# Patient Record
Sex: MALE | Race: WHITE | NOT HISPANIC OR LATINO | Employment: OTHER | URBAN - METROPOLITAN AREA
[De-identification: names, ages, dates, MRNs, and addresses within clinical notes are randomized per-mention and may not be internally consistent; named-entity substitution may affect disease eponyms.]

---

## 2019-10-18 ENCOUNTER — CLINICAL SUPPORT (OUTPATIENT)
Dept: UROLOGY | Facility: CLINIC | Age: 54
End: 2019-10-18
Payer: COMMERCIAL

## 2019-10-18 DIAGNOSIS — N52.9 ERECTILE DYSFUNCTION, UNSPECIFIED ERECTILE DYSFUNCTION TYPE: Primary | ICD-10-CM

## 2019-10-18 LAB
AMORPHOUS URINE: ABNORMAL /LPF
APPEARANCE, UA: CLEAR
BACTERIA SPEC CULT: ABNORMAL /HPF
BASOPHILS NFR SNV MANUAL: 0.5 % (ref 0–3)
BILIRUB UR QL STRIP: NEGATIVE MG/DL
BUN SERPL-MCNC: 12 MG/DL (ref 7–18)
BUN/CREAT SERPL: 6.55 RATIO (ref 7–18)
CALCIUM SERPL-MCNC: 9.6 MG/DL (ref 8.8–10.5)
CHLORIDE SERPL-SCNC: 103 MMOL/L (ref 100–108)
CO2 SERPL-SCNC: 30 MMOL/L (ref 21–32)
COLOR UR: ABNORMAL
CREAT SERPL-MCNC: 1.83 MG/DL (ref 0.7–1.3)
EOSINOPHIL NFR SNV MANUAL: 3.4 % (ref 1–3)
ERYTHROCYTE [DISTWIDTH] IN BLOOD BY AUTOMATED COUNT: 13.4 % (ref 12.5–18)
GFR ESTIMATION: 39
GLUCOSE (UA): NORMAL MG/DL
GLUCOSE SERPL-MCNC: 91 MG/DL (ref 70–110)
HCT VFR BLD AUTO: 50.6 % (ref 42–52)
HGB BLD-MCNC: 17.5 G/DL (ref 14–18)
HGB UR QL STRIP: NEGATIVE /UL
KETONES UR QL STRIP: NEGATIVE MG/DL
LEUKOCYTE ESTERASE UR QL STRIP: 25 /UL
LYMPHOCYTES NFR SNV MANUAL: 20.9 % (ref 25–40)
MANUAL NRBC PER 100 CELLS: 0 %
MCH RBC QN AUTO: 31.6 PG (ref 27–31.2)
MCHC RBC AUTO-ENTMCNC: 34.6 G/DL (ref 31.8–35.4)
MCV RBC AUTO: 91.5 FL (ref 80–97)
MONOCYTES/100 LEUKOCYTES: 6.9 % (ref 1–15)
MUCUS URINE: ABNORMAL /LPF
NEUTROPHILS NFR BLD: 10.05 10*3/UL (ref 1.8–7.7)
NEUTROPHILS NFR SNV MANUAL: 67.8 % (ref 37–80)
NITRITE UR QL STRIP: NEGATIVE
PH UR STRIP: 7 PH (ref 5–9)
PLATELETS: 383 10*3/UL (ref 142–424)
POTASSIUM SERPL-SCNC: 4.1 MMOL/L (ref 3.6–5.2)
PROT UR QL STRIP: NEGATIVE MG/DL
RBC # BLD AUTO: 5.53 10*6/UL (ref 4.7–6.1)
RBC #/AREA URNS HPF: ABNORMAL /HPF (ref 0–2)
SERVICE COMMENT 03: ABNORMAL
SODIUM BLD-SCNC: 141 MMOL/L (ref 135–145)
SP GR UR STRIP: 1.01 (ref 1–1.03)
SPECIMEN COLLECTION METHOD, URINE: ABNORMAL
SQUAMOUS EPITHELIAL, UA: ABNORMAL /LPF
UROBILINOGEN UR STRIP-ACNC: NORMAL MG/DL
WBC # BLD: 14.9 10*3/UL (ref 4.6–10.2)
WBC #/AREA URNS HPF: ABNORMAL /HPF (ref 0–5)

## 2019-10-24 ENCOUNTER — OUTSIDE PLACE OF SERVICE (OUTPATIENT)
Dept: UROLOGY | Facility: CLINIC | Age: 54
End: 2019-10-24
Payer: COMMERCIAL

## 2019-10-24 PROCEDURE — 54405 INSERT MULTI-COMP PENIS PROS: CPT | Mod: ,,, | Performed by: UROLOGY

## 2019-10-24 PROCEDURE — 54405 PR INSERT,INFLATABLE PENILE PROSTHESIS: ICD-10-PCS | Mod: ,,, | Performed by: UROLOGY

## 2019-11-19 ENCOUNTER — OFFICE VISIT (OUTPATIENT)
Dept: UROLOGY | Facility: CLINIC | Age: 54
End: 2019-11-19
Payer: COMMERCIAL

## 2019-11-19 VITALS
DIASTOLIC BLOOD PRESSURE: 82 MMHG | BODY MASS INDEX: 38.51 KG/M2 | HEIGHT: 69 IN | HEART RATE: 91 BPM | WEIGHT: 260 LBS | RESPIRATION RATE: 18 BRPM | SYSTOLIC BLOOD PRESSURE: 138 MMHG

## 2019-11-19 DIAGNOSIS — N52.9 ERECTILE DYSFUNCTION, UNSPECIFIED ERECTILE DYSFUNCTION TYPE: Primary | ICD-10-CM

## 2019-11-19 PROCEDURE — 99024 POSTOP FOLLOW-UP VISIT: CPT | Mod: S$GLB,,, | Performed by: UROLOGY

## 2019-11-19 PROCEDURE — 99024 PR POST-OP FOLLOW-UP VISIT: ICD-10-PCS | Mod: S$GLB,,, | Performed by: UROLOGY

## 2019-11-19 RX ORDER — TOPIRAMATE 100 MG/1
TABLET, FILM COATED ORAL
COMMUNITY
Start: 2019-10-26

## 2019-11-19 RX ORDER — FLUTICASONE PROPIONATE 50 MCG
SPRAY, SUSPENSION (ML) NASAL
COMMUNITY
Start: 2019-11-18

## 2019-11-19 RX ORDER — TIOTROPIUM BROMIDE AND OLODATEROL 3.124; 2.736 UG/1; UG/1
SPRAY, METERED RESPIRATORY (INHALATION)
COMMUNITY
Start: 2019-11-18

## 2019-11-19 RX ORDER — PANTOPRAZOLE SODIUM 40 MG/1
TABLET, DELAYED RELEASE ORAL
COMMUNITY
Start: 2019-10-12

## 2019-11-19 RX ORDER — TESTOSTERONE CYPIONATE 200 MG/ML
INJECTION, SOLUTION INTRAMUSCULAR
COMMUNITY
Start: 2019-11-18

## 2019-11-19 RX ORDER — ALBUTEROL SULFATE 90 UG/1
AEROSOL, METERED RESPIRATORY (INHALATION)
COMMUNITY
Start: 2019-10-26

## 2019-11-19 RX ORDER — METHOCARBAMOL 500 MG/1
TABLET, FILM COATED ORAL
COMMUNITY
Start: 2019-11-06

## 2019-11-19 RX ORDER — CEPHALEXIN 500 MG/1
CAPSULE ORAL
Refills: 0 | COMMUNITY
Start: 2019-10-25

## 2019-11-19 RX ORDER — CIPROFLOXACIN 500 MG/1
TABLET ORAL
Refills: 0 | COMMUNITY
Start: 2019-10-07

## 2019-11-19 RX ORDER — MONTELUKAST SODIUM 10 MG/1
TABLET ORAL
COMMUNITY
Start: 2019-10-18

## 2019-11-19 RX ORDER — SIMVASTATIN 20 MG/1
TABLET, FILM COATED ORAL
COMMUNITY
Start: 2019-09-17

## 2019-11-19 RX ORDER — CARBAMAZEPINE 200 MG/1
TABLET ORAL
COMMUNITY
Start: 2019-11-18

## 2019-11-19 RX ORDER — PROMETHAZINE HYDROCHLORIDE 25 MG/1
TABLET ORAL
Refills: 0 | COMMUNITY
Start: 2019-10-07

## 2019-11-19 RX ORDER — OXYCODONE AND ACETAMINOPHEN 7.5; 325 MG/1; MG/1
TABLET ORAL
Refills: 0 | COMMUNITY
Start: 2019-10-25

## 2019-11-19 RX ORDER — CALCITRIOL 3 UG/G
OINTMENT TOPICAL
COMMUNITY
Start: 2019-10-26

## 2019-11-19 RX ORDER — POTASSIUM CHLORIDE 1500 MG/1
TABLET, EXTENDED RELEASE ORAL
COMMUNITY
Start: 2019-10-18

## 2019-11-19 RX ORDER — HYDROCODONE BITARTRATE AND ACETAMINOPHEN 10; 325 MG/1; MG/1
TABLET ORAL
COMMUNITY
Start: 2019-10-01

## 2019-11-19 RX ORDER — CLOBETASOL PROPIONATE 0.5 MG/G
OINTMENT TOPICAL
COMMUNITY
Start: 2019-10-26

## 2019-11-19 RX ORDER — CHLORHEXIDINE GLUCONATE 1.2 MG/ML
RINSE BUCCAL
COMMUNITY
Start: 2019-10-26

## 2019-11-19 NOTE — LETTER
November 19, 2019      [facility]  [address] [address]  Phone: [phone]  Fax: [phone]       Patient: Óscra Marina   YOB: 1965  Date of Visit: 11/19/2019    To Whom It May Concern:    TEDDY Marina  was at [facility] on 11/19/2019. He may return to work/school on 11/19/2019 with no restrictions. If you have any questions or concerns, or if I can be of further assistance, please do not hesitate to contact me.    Sincerely,    Anum Rodriges LPN

## 2019-11-19 NOTE — PROGRESS NOTES
Subjective:       Patient ID: Óscar Marina is a 54 y.o. male.    Chief Complaint: Other (3 week prosthesis f/u )      HPI: Post op prosthesis 3 weeks      Past Medical History: History reviewed. No pertinent past medical history.    Past Surgical Historical: History reviewed. No pertinent surgical history.     Medications:   Medication List with Changes/Refills   Current Medications    CARBAMAZEPINE (TEGRETOL) 200 MG TABLET        CEPHALEXIN (KEFLEX) 500 MG CAPSULE    TK ONE C PO QID    CIPROFLOXACIN HCL (CIPRO) 500 MG TABLET    TK 1 T PO Q 12 H FOR 5 DAYS    CLOBETASOL 0.05% (TEMOVATE) 0.05 % OINT        DEPO-TESTOSTERONE 200 MG/ML INJECTION        FLUTICASONE PROPIONATE (FLONASE) 50 MCG/ACTUATION NASAL SPRAY        HYDROCODONE-ACETAMINOPHEN (NORCO)  MG PER TABLET        KLOR-CON M20 20 MEQ TABLET        METHOCARBAMOL (ROBAXIN) 500 MG TAB        MONTELUKAST (SINGULAIR) 10 MG TABLET        OXYCODONE-ACETAMINOPHEN (PERCOCET) 7.5-325 MG PER TABLET    TK 1 T PO Q 6 H PRF PAIN    PANTOPRAZOLE (PROTONIX) 40 MG TABLET        PERIOGARD 0.12 % SOLUTION        PROAIR HFA 90 MCG/ACTUATION INHALER        PROMETHAZINE (PHENERGAN) 25 MG TABLET    TK 1 T PO Q 6 H PRF NAUSEA    SIMVASTATIN (ZOCOR) 20 MG TABLET        STIOLTO RESPIMAT 2.5-2.5 MCG/ACTUATION MIST        TOPIRAMATE (TOPAMAX) 100 MG TABLET        VECTICAL 3 MCG/GRAM OINTMENT            Past Social History:   Social History     Socioeconomic History    Marital status:      Spouse name: Not on file    Number of children: Not on file    Years of education: Not on file    Highest education level: Not on file   Occupational History    Not on file   Social Needs    Financial resource strain: Not on file    Food insecurity:     Worry: Not on file     Inability: Not on file    Transportation needs:     Medical: Not on file     Non-medical: Not on file   Tobacco Use    Smoking status: Current Every Day Smoker     Packs/day: 0.25     Types: Cigarettes    Substance and Sexual Activity    Alcohol use: Not Currently     Frequency: Never    Drug use: Never    Sexual activity: Not on file   Lifestyle    Physical activity:     Days per week: Not on file     Minutes per session: Not on file    Stress: Not on file   Relationships    Social connections:     Talks on phone: Not on file     Gets together: Not on file     Attends Restorationist service: Not on file     Active member of club or organization: Not on file     Attends meetings of clubs or organizations: Not on file     Relationship status: Not on file   Other Topics Concern    Not on file   Social History Narrative    Not on file       Allergies:   Review of patient's allergies indicates:   Allergen Reactions    Morphine     Sulfa (sulfonamide antibiotics)         Family History: History reviewed. No pertinent family history.     Review of Systems:  Review of Systems   Constitutional: Negative for activity change and appetite change.   HENT: Negative for congestion and dental problem.    Respiratory: Negative for chest tightness and shortness of breath.    Cardiovascular: Negative for chest pain.   Gastrointestinal: Negative for abdominal distention and abdominal pain.   Genitourinary: Negative for decreased urine volume, difficulty urinating, discharge, dysuria, enuresis, flank pain, frequency, genital sores, hematuria, penile pain, penile swelling, scrotal swelling, testicular pain and urgency.   Musculoskeletal: Negative for back pain and neck pain.   Neurological: Negative for dizziness.   Hematological: Negative for adenopathy.   Psychiatric/Behavioral: Negative for agitation, behavioral problems and confusion.       Physical Exam:  Physical Exam   Nursing note and vitals reviewed.  Constitutional: He is oriented to person, place, and time. He appears well-developed and well-nourished.   HENT:   Head: Normocephalic.   Cardiovascular: Normal rate, regular rhythm and normal heart sounds.     Pulmonary/Chest: Effort normal and breath sounds normal.   Abdominal: Soft. Bowel sounds are normal.   Genitourinary:         Neurological: He is alert and oriented to person, place, and time.   Skin: Skin is warm and dry.         Assessment/Plan:     post op prosthesis healing well  Fu three weeks  Problem List Items Addressed This Visit     None

## 2019-12-06 ENCOUNTER — OFFICE VISIT (OUTPATIENT)
Dept: UROLOGY | Facility: CLINIC | Age: 54
End: 2019-12-06
Payer: COMMERCIAL

## 2019-12-06 VITALS
RESPIRATION RATE: 20 BRPM | HEART RATE: 84 BPM | BODY MASS INDEX: 38.36 KG/M2 | HEIGHT: 69 IN | SYSTOLIC BLOOD PRESSURE: 140 MMHG | WEIGHT: 259 LBS | DIASTOLIC BLOOD PRESSURE: 82 MMHG

## 2019-12-06 DIAGNOSIS — N52.9 ERECTILE DYSFUNCTION, UNSPECIFIED ERECTILE DYSFUNCTION TYPE: Primary | ICD-10-CM

## 2019-12-06 DIAGNOSIS — E29.1 HYPOGONADISM MALE: ICD-10-CM

## 2019-12-06 LAB — TESTOST SERPL-MCNC: 1064 NG/DL (ref 95–948)

## 2019-12-06 PROCEDURE — 99024 PR POST-OP FOLLOW-UP VISIT: ICD-10-PCS | Mod: S$GLB,,, | Performed by: NURSE PRACTITIONER

## 2019-12-06 PROCEDURE — 99024 POSTOP FOLLOW-UP VISIT: CPT | Mod: S$GLB,,, | Performed by: NURSE PRACTITIONER

## 2019-12-06 NOTE — LETTER
December 6, 2019      [facility]  [address] [address]  Phone: [phone]  Fax: [phone]       Patient: Óscar Marina   YOB: 1965  Date of Visit: 12/06/2019    To Whom It May Concern:    TEDDY Marina  was at [facility] on 12/06/2019. He may return to work/school on 12/7/19 with no restrictions. If you have any questions or concerns, or if I can be of further assistance, please do not hesitate to contact me.    Sincerely,    Sloane Forde MA

## 2019-12-06 NOTE — PROGRESS NOTES
Subjective:       Patient ID: Óscar Marina is a 54 y.o. male.    Chief Complaint: Other (6 week post op F/U for penile prosthesis)      HPI: 54-year-old male, presents 6 week postop penile prosthesis.  Patient states he is doing well.  Denies any fever.  Denies any pain at the surgery site.  Patient also has a history of hypogonadism.  At 1 point he was taking 400 mg testosterone injections every 2 weeks.  About 8 to 10 weeks ago we decreased his testosterone to 200 mg every 2 weeks.  Patient states his energy level is okay.  Patient denies any pain or burning with urination.  States has a good stream.  Denies seeing blood in his urine.  No other urinary complaints.  Other health problems are stable at this time.      Past Medical History:   Past Medical History:   Diagnosis Date    CHF (congestive heart failure)     Depression     DJD (degenerative joint disease)     Hyperlipidemia     Hypogonadism in male     Migraines     PTSD (post-traumatic stress disorder)     TBI (traumatic brain injury)        Past Surgical Historical:   Past Surgical History:   Procedure Laterality Date    APPENDECTOMY      KNEE SURGERY      PENILE PROSTHESIS IMPLANT      SINUS SURGERY          Medications:   Medication List with Changes/Refills   Current Medications    CARBAMAZEPINE (TEGRETOL) 200 MG TABLET        CEPHALEXIN (KEFLEX) 500 MG CAPSULE    TK ONE C PO QID    CIPROFLOXACIN HCL (CIPRO) 500 MG TABLET    TK 1 T PO Q 12 H FOR 5 DAYS    CLOBETASOL 0.05% (TEMOVATE) 0.05 % OINT        DEPO-TESTOSTERONE 200 MG/ML INJECTION        FLUTICASONE PROPIONATE (FLONASE) 50 MCG/ACTUATION NASAL SPRAY        HYDROCODONE-ACETAMINOPHEN (NORCO)  MG PER TABLET        KLOR-CON M20 20 MEQ TABLET        METHOCARBAMOL (ROBAXIN) 500 MG TAB        MONTELUKAST (SINGULAIR) 10 MG TABLET        OXYCODONE-ACETAMINOPHEN (PERCOCET) 7.5-325 MG PER TABLET    TK 1 T PO Q 6 H PRF PAIN    PANTOPRAZOLE (PROTONIX) 40 MG TABLET        PERIOGARD  0.12 % SOLUTION        PROAIR HFA 90 MCG/ACTUATION INHALER        PROMETHAZINE (PHENERGAN) 25 MG TABLET    TK 1 T PO Q 6 H PRF NAUSEA    SIMVASTATIN (ZOCOR) 20 MG TABLET        STIOLTO RESPIMAT 2.5-2.5 MCG/ACTUATION MIST        TOPIRAMATE (TOPAMAX) 100 MG TABLET        VECTICAL 3 MCG/GRAM OINTMENT            Past Social History:   Social History     Socioeconomic History    Marital status:      Spouse name: Not on file    Number of children: Not on file    Years of education: Not on file    Highest education level: Not on file   Occupational History    Not on file   Social Needs    Financial resource strain: Not on file    Food insecurity:     Worry: Not on file     Inability: Not on file    Transportation needs:     Medical: Not on file     Non-medical: Not on file   Tobacco Use    Smoking status: Current Every Day Smoker     Packs/day: 0.25     Types: Cigarettes    Smokeless tobacco: Never Used   Substance and Sexual Activity    Alcohol use: Not Currently     Frequency: Never    Drug use: Never    Sexual activity: Not on file   Lifestyle    Physical activity:     Days per week: Not on file     Minutes per session: Not on file    Stress: Not on file   Relationships    Social connections:     Talks on phone: Not on file     Gets together: Not on file     Attends Church service: Not on file     Active member of club or organization: Not on file     Attends meetings of clubs or organizations: Not on file     Relationship status: Not on file   Other Topics Concern    Not on file   Social History Narrative    Not on file       Allergies:   Review of patient's allergies indicates:   Allergen Reactions    Morphine     Sulfa (sulfonamide antibiotics)         Family History: History reviewed. No pertinent family history.     Review of Systems:  Review of Systems   Constitutional: Negative for activity change and appetite change.   HENT: Negative for congestion and dental problem.     Respiratory: Negative for chest tightness and shortness of breath.    Cardiovascular: Negative for chest pain.   Gastrointestinal: Negative for abdominal distention and abdominal pain.   Genitourinary: Negative for decreased urine volume, difficulty urinating, discharge, dysuria, enuresis, flank pain, frequency, genital sores, hematuria, penile pain, penile swelling, scrotal swelling, testicular pain and urgency.   Musculoskeletal: Negative for back pain and neck pain.   Neurological: Negative for dizziness.   Hematological: Negative for adenopathy.   Psychiatric/Behavioral: Negative for agitation, behavioral problems and confusion.       Physical Exam:  Physical Exam   Nursing note and vitals reviewed.  Constitutional: He is oriented to person, place, and time. He appears well-developed and well-nourished.   HENT:   Head: Normocephalic.   Eyes: Pupils are equal, round, and reactive to light.   Neck: Normal range of motion. Neck supple.   Cardiovascular: Normal rate, regular rhythm and normal heart sounds.    Pulmonary/Chest: Effort normal and breath sounds normal.   Abdominal: Soft. Bowel sounds are normal.   Genitourinary: Penis normal.   Genitourinary Comments: Incision site appears clean and intact, no sign or symptom of infection.  I was able to inflate and deflate the pump without complications.   Musculoskeletal: Normal range of motion.   Neurological: He is alert and oriented to person, place, and time.   Skin: Skin is warm and dry.     Psychiatric: He has a normal mood and affect. His behavior is normal.       Assessment/Plan:   1.  Erectile dysfunction:  Patient is doing well with penile prosthesis.  Patient was instructed on how to inflate and deflate prosthesis.  Patient stated understanding.  2.  Hypogonadism:  Within check the patient's testosterone level.  We will notify him of the results.  As long as his testosterone levels are good we will continue to mg every 2 weeks.  Will plan follow-up in 3  months, sooner if needed.  Problem List Items Addressed This Visit     None      Visit Diagnoses     Erectile dysfunction, unspecified erectile dysfunction type    -  Primary    Hypogonadism male        Relevant Orders    Testosterone

## 2019-12-09 ENCOUNTER — TELEPHONE (OUTPATIENT)
Dept: UROLOGY | Facility: CLINIC | Age: 54
End: 2019-12-09

## 2019-12-10 ENCOUNTER — TELEPHONE (OUTPATIENT)
Dept: UROLOGY | Facility: CLINIC | Age: 54
End: 2019-12-10

## 2019-12-10 ENCOUNTER — DOCUMENTATION ONLY (OUTPATIENT)
Dept: UROLOGY | Facility: CLINIC | Age: 54
End: 2019-12-10

## 2019-12-10 NOTE — PROGRESS NOTES
12/06/19 Testosterone is 1064  Pt needs to decrease testosterone injections from 200mg to 150mg q2 weeks.

## 2019-12-12 ENCOUNTER — TELEPHONE (OUTPATIENT)
Dept: UROLOGY | Facility: CLINIC | Age: 54
End: 2019-12-12

## 2019-12-12 NOTE — TELEPHONE ENCOUNTER
----- Message from Ute Morel sent at 12/11/2019 10:17 AM CST -----  Contact: patient   Requesting a call back regarding implant question.Please call back at [phone]      Ute Matthews

## 2019-12-19 ENCOUNTER — OFFICE VISIT (OUTPATIENT)
Dept: UROLOGY | Facility: CLINIC | Age: 54
End: 2019-12-19
Payer: COMMERCIAL

## 2019-12-19 VITALS
SYSTOLIC BLOOD PRESSURE: 128 MMHG | RESPIRATION RATE: 20 BRPM | DIASTOLIC BLOOD PRESSURE: 68 MMHG | HEART RATE: 72 BPM | BODY MASS INDEX: 38.36 KG/M2 | WEIGHT: 259 LBS | HEIGHT: 69 IN

## 2019-12-19 DIAGNOSIS — N52.9 ERECTILE DYSFUNCTION, UNSPECIFIED ERECTILE DYSFUNCTION TYPE: Primary | ICD-10-CM

## 2019-12-19 DIAGNOSIS — E29.1 HYPOGONADISM MALE: ICD-10-CM

## 2019-12-19 PROCEDURE — 99024 PR POST-OP FOLLOW-UP VISIT: ICD-10-PCS | Mod: S$GLB,,, | Performed by: NURSE PRACTITIONER

## 2019-12-19 PROCEDURE — 99024 POSTOP FOLLOW-UP VISIT: CPT | Mod: S$GLB,,, | Performed by: NURSE PRACTITIONER

## 2019-12-19 NOTE — LETTER
December 19, 2019      [facility]  [address] [address]  Phone: [phone]  Fax: [phone]       Patient: Óscar Marina   YOB: 1965  Date of Visit: 12/19/2019    To Whom It May Concern:    TEDDY Marina  was at [facility] on 12/19/2019. He may return to work on 12/20/19 with no restrictions. If you have any questions or concerns, or if I can be of further assistance, please do not hesitate to contact me.    Sincerely,    Sloane Forde MA

## 2022-10-17 ENCOUNTER — TELEPHONE (OUTPATIENT)
Dept: UROLOGY | Facility: CLINIC | Age: 57
End: 2022-10-17
Payer: OTHER GOVERNMENT

## 2022-10-17 NOTE — TELEPHONE ENCOUNTER
Attempted to contact, [facility]. [facility]    ----- Message from Henri Caballero sent at 10/17/2022 11:27 AM CDT -----  Contact: Patient  Patient need to reschedule his appointment         #  [phone]

## 2022-10-17 NOTE — TELEPHONE ENCOUNTER
----- Message from Belkis Siddiqui sent at 10/17/2022  3:48 PM CDT -----  Contact: Aspen (spouse)  Type:  Patient Returning Call    Who Called: Aspen (spouse)  Who Left Message for Patient: Shanice  Does the patient know what this is regarding?: Rescheduling appt  Would the patient rather a call back or a response via [facility]?  Call back   Best Call Back Number: [phone]  Additional Information: n/a

## 2023-03-29 DIAGNOSIS — Z01.89 RADIOLOGICAL EXAMINATION, NOT ELSEWHERE CLASSIFIED: Primary | ICD-10-CM

## 2023-04-17 ENCOUNTER — HOSPITAL ENCOUNTER (OUTPATIENT)
Dept: RADIOLOGY | Facility: HOSPITAL | Age: 58
Discharge: HOME | End: 2023-04-17
Attending: FAMILY MEDICINE
Payer: OTHER GOVERNMENT

## 2023-04-17 DIAGNOSIS — Z01.89 RADIOLOGICAL EXAMINATION, NOT ELSEWHERE CLASSIFIED: ICD-10-CM

## 2023-04-17 PROCEDURE — 70551 MRI BRAIN STEM W/O DYE: CPT | Mod: TC

## 2023-07-19 ENCOUNTER — HOSPITAL ENCOUNTER (EMERGENCY)
Facility: HOSPITAL | Age: 58
Discharge: HOME | End: 2023-07-19
Attending: EMERGENCY MEDICINE
Payer: OTHER GOVERNMENT

## 2023-07-19 VITALS
WEIGHT: 240 LBS | BODY MASS INDEX: 35.55 KG/M2 | SYSTOLIC BLOOD PRESSURE: 164 MMHG | HEIGHT: 69 IN | RESPIRATION RATE: 27 BRPM | DIASTOLIC BLOOD PRESSURE: 95 MMHG | OXYGEN SATURATION: 95 % | TEMPERATURE: 98 F | HEART RATE: 71 BPM

## 2023-07-19 DIAGNOSIS — G44.209 TENSION-TYPE HEADACHE, NOT INTRACTABLE, UNSPECIFIED CHRONICITY PATTERN: ICD-10-CM

## 2023-07-19 DIAGNOSIS — I10 HYPERTENSION, UNSPECIFIED TYPE: ICD-10-CM

## 2023-07-19 DIAGNOSIS — R03.0 ELEVATED BLOOD PRESSURE READING: Primary | ICD-10-CM

## 2023-07-19 LAB
ALBUMIN SERPL-MCNC: 4.1 G/DL (ref 3.5–5)
ALBUMIN/GLOB SERPL: 1.2 RATIO (ref 1.1–2)
ALP SERPL-CCNC: 108 UNIT/L (ref 40–150)
ALT SERPL-CCNC: 35 UNIT/L (ref 0–55)
AST SERPL-CCNC: 30 UNIT/L (ref 5–34)
BASOPHILS # BLD AUTO: 0.07 X10(3)/MCL
BASOPHILS NFR BLD AUTO: 0.8 %
BILIRUBIN DIRECT+TOT PNL SERPL-MCNC: 0.5 MG/DL
BUN SERPL-MCNC: 9 MG/DL (ref 8.4–25.7)
CALCIUM SERPL-MCNC: 9.6 MG/DL (ref 8.4–10.2)
CHLORIDE SERPL-SCNC: 100 MMOL/L (ref 98–107)
CO2 SERPL-SCNC: 30 MMOL/L (ref 22–29)
CREAT SERPL-MCNC: 1.13 MG/DL (ref 0.73–1.18)
EOSINOPHIL # BLD AUTO: 0.45 X10(3)/MCL (ref 0–0.9)
EOSINOPHIL NFR BLD AUTO: 5.5 %
ERYTHROCYTE [DISTWIDTH] IN BLOOD BY AUTOMATED COUNT: 13.3 % (ref 11.5–17)
GFR SERPLBLD CREATININE-BSD FMLA CKD-EPI: >60 MLS/MIN/1.73/M2
GLOBULIN SER-MCNC: 3.3 GM/DL (ref 2.4–3.5)
GLUCOSE SERPL-MCNC: 116 MG/DL (ref 74–100)
HCT VFR BLD AUTO: 55.6 % (ref 42–52)
HGB BLD-MCNC: 19.1 G/DL (ref 14–18)
IMM GRANULOCYTES # BLD AUTO: 0.02 X10(3)/MCL (ref 0–0.04)
IMM GRANULOCYTES NFR BLD AUTO: 0.2 %
LYMPHOCYTES # BLD AUTO: 2.26 X10(3)/MCL (ref 0.6–4.6)
LYMPHOCYTES NFR BLD AUTO: 27.4 %
MCH RBC QN AUTO: 30.9 PG (ref 27–31)
MCHC RBC AUTO-ENTMCNC: 34.4 G/DL (ref 33–36)
MCV RBC AUTO: 90 FL (ref 80–94)
MONOCYTES # BLD AUTO: 0.61 X10(3)/MCL (ref 0.1–1.3)
MONOCYTES NFR BLD AUTO: 7.4 %
NEUTROPHILS # BLD AUTO: 4.83 X10(3)/MCL (ref 2.1–9.2)
NEUTROPHILS NFR BLD AUTO: 58.7 %
PLATELET # BLD AUTO: 301 X10(3)/MCL (ref 130–400)
PLATELET # BLD EST: ADEQUATE 10*3/UL
PMV BLD AUTO: 8.8 FL (ref 7.4–10.4)
POTASSIUM SERPL-SCNC: 3.4 MMOL/L (ref 3.5–5.1)
PROT SERPL-MCNC: 7.4 GM/DL (ref 6.4–8.3)
RBC # BLD AUTO: 6.18 X10(6)/MCL (ref 4.7–6.1)
RBC MORPH BLD: NORMAL
SODIUM SERPL-SCNC: 140 MMOL/L (ref 136–145)
WBC # SPEC AUTO: 8.24 X10(3)/MCL (ref 4.5–11.5)

## 2023-07-19 PROCEDURE — 85025 COMPLETE CBC W/AUTO DIFF WBC: CPT | Performed by: EMERGENCY MEDICINE

## 2023-07-19 PROCEDURE — 99284 EMERGENCY DEPT VISIT MOD MDM: CPT | Mod: 25

## 2023-07-19 PROCEDURE — 80053 COMPREHEN METABOLIC PANEL: CPT | Performed by: EMERGENCY MEDICINE

## 2023-07-19 PROCEDURE — 25000003 PHARM REV CODE 250: Performed by: EMERGENCY MEDICINE

## 2023-07-19 RX ORDER — LISINOPRIL 20 MG/1
20 TABLET ORAL DAILY
Qty: 30 TABLET | Refills: 0 | Status: SHIPPED | OUTPATIENT
Start: 2023-07-19 | End: 2023-08-18

## 2023-07-19 RX ORDER — LISINOPRIL 10 MG/1
20 TABLET ORAL
Status: COMPLETED | OUTPATIENT
Start: 2023-07-19 | End: 2023-07-19

## 2023-07-19 RX ADMIN — LISINOPRIL 20 MG: 10 TABLET ORAL at 12:07

## 2023-07-19 NOTE — DISCHARGE INSTRUCTIONS
Avoid salt     continue previous medication    Return for any emergency problem    The blood pressure medication is once a day you need another dose tomorrow morning

## 2023-07-19 NOTE — ED PROVIDER NOTES
Encounter Date: 2023       History     Chief Complaint   Patient presents with    Hypertension    Headache     Headaches starting 4-5 days ago     checked BP   170's /110's    no hx htn       227/121 in triage     Headache that was different than his normal migraines for the past 4 5 days came the ED today because start checking his blood pressures and saw they were high.  Denies ever being told he has high blood pressure in the past [facility] does [facility] a but I cannot see any medication he was ever on for it   Wife at the bedside denies dysfunction for speech attitude behavior weakness 1 side or the other    Patient has not had nausea vomiting fever or chills he has not had any unilateral weakness speech difficulties he has not had any new change in his medications.  Patient has a history of quitting smoking about 1 year ago.  He drinks 3-4 alcoholic drinks a day.  He is not do street drugs.  He has diabetes high cholesterol migraine headaches chronic kidney disease stage 3 has received a penile prosthesis.  Hypercholesterolemia.  Vaccines for COVID pneumonia fluid tetanus all up-to-date.  Surgical history site CIS time 1 appendectomy penile prosthesis right knee scope.      Sees Dr. Golden at the [facility] says they called but nobody answered today.  Mother  old age dad  with diabetes kidney disease coronary artery disease.  Disabled       Review of patient's allergies indicates:   Allergen Reactions    Morphine     Sulfa (sulfonamide antibiotics)      Past Medical History:   Diagnosis Date    CHF (congestive heart failure)     Depression     DJD (degenerative joint disease)     Hyperlipidemia     Hypogonadism in male     Migraines     PTSD (post-traumatic stress disorder)     TBI (traumatic brain injury)      Past Surgical History:   Procedure Laterality Date    APPENDECTOMY      KNEE SURGERY      PENILE PROSTHESIS IMPLANT      SINUS SURGERY       No family history on  file.  Social History     Tobacco Use    Smoking status: Every Day     Packs/day: 0.25     Types: Cigarettes    Smokeless tobacco: Never   Substance Use Topics    Alcohol use: Not Currently    Drug use: Never     Review of Systems   Constitutional: Negative.    HENT: Negative.     Eyes: Negative.  Negative for visual disturbance.   Respiratory: Negative.     Cardiovascular: Negative.    Gastrointestinal: Negative.    Endocrine: Negative.    Genitourinary: Negative.    Musculoskeletal: Negative.    Skin: Negative.    Allergic/Immunologic: Negative.    Neurological:  Positive for headaches.   Hematological: Negative.    Psychiatric/Behavioral: Negative.     All other systems reviewed and are negative.    Physical Exam     Initial Vitals [07/19/23 1026]   BP Pulse Resp Temp SpO2   (!) 229/121 80 16 97.9 °F (36.6 °C) 98 %      MAP       --         Physical Exam    Nursing note and vitals reviewed.  Constitutional: He appears well-developed and well-nourished.   HENT:   Head: Normocephalic and atraumatic.   Right Ear: Tympanic membrane and external ear normal.   Left Ear: Tympanic membrane and external ear normal.   Nose: Nose normal.   Mouth/Throat: Oropharynx is clear and moist and mucous membranes are normal. Oral lesions: moist muc memb.   Eyes: Conjunctivae and EOM are normal. Pupils are equal, round, and reactive to light.   Neck: Neck supple. No thyromegaly present. No tracheal deviation present. No JVD present.   Normal range of motion.  Cardiovascular:  Normal rate, regular rhythm, normal heart sounds and intact distal pulses.     Exam reveals no gallop and no friction rub.       No murmur heard.  Pulmonary/Chest: Breath sounds normal. No stridor. No respiratory distress. He has no wheezes. He has no rhonchi. He has no rales. He exhibits no tenderness.   Abdominal: Abdomen is soft. Bowel sounds are normal. He exhibits no distension and no mass. No signs of injury. There is no abdominal tenderness.    Genitourinary:    Genitourinary Comments: Penile prosthesis in place    No CVA tenderness     Musculoskeletal:         General: No tenderness or edema. Normal range of motion.      Cervical back: Normal range of motion and neck supple.     Lymphadenopathy:     He has no cervical adenopathy.   Neurological: He is alert and oriented to person, place, and time. He has normal strength. No cranial nerve deficit or sensory deficit. GCS score is 15. GCS eye subscore is 4. GCS verbal subscore is 5. GCS motor subscore is 6.   Skin: Skin is warm and dry. Capillary refill takes 2 to 3 seconds. No rash noted.   Psychiatric: He has a normal mood and affect. His behavior is normal. Judgment and thought content normal.       ED Course   Procedures  Labs Reviewed   COMPREHENSIVE METABOLIC PANEL - Abnormal; Notable for the following components:       Result Value    Potassium Level 3.4 (*)     Carbon Dioxide 30 (*)     Glucose Level 116 (*)     All other components within normal limits   CBC WITH DIFFERENTIAL - Abnormal; Notable for the following components:    RBC 6.18 (*)     Hgb 19.1 (*)     Hct 55.6 (*)     All other components within normal limits   BLOOD SMEAR MICROSCOPIC EXAM (OLG) - Normal   CBC W/ AUTO DIFFERENTIAL    Narrative:     The following orders were created for panel order CBC auto differential.  Procedure                               Abnormality         Status                     ---------                               -----------         ------                     CBC with Differential[[id number]]        Abnormal            Final result                 Please view results for these tests on the individual orders.     Recent Results (from the past 3 hour(s))   Comprehensive metabolic panel    Collection Time: 07/19/23 11:09 AM   Result Value Ref Range    Sodium Level 140 136 - 145 mmol/L    Potassium Level 3.4 (L) 3.5 - 5.1 mmol/L    Chloride 100 98 - 107 mmol/L    Carbon Dioxide 30 (H) 22 - 29 mmol/L    Glucose  Level 116 (H) 74 - 100 mg/dL    Blood Urea Nitrogen 9.0 8.4 - 25.7 mg/dL    Creatinine 1.13 0.73 - 1.18 mg/dL    Calcium Level Total 9.6 8.4 - 10.2 mg/dL    Protein Total 7.4 6.4 - 8.3 gm/dL    Albumin Level 4.1 3.5 - 5.0 g/dL    Globulin 3.3 2.4 - 3.5 gm/dL    Albumin/Globulin Ratio 1.2 1.1 - 2.0 ratio    Bilirubin Total 0.5 <=1.5 mg/dL    Alkaline Phosphatase 108 40 - 150 unit/L    Alanine Aminotransferase 35 0 - 55 unit/L    Aspartate Aminotransferase 30 5 - 34 unit/L    eGFR >60 mls/min/1.73/m2   CBC with Differential    Collection Time: 07/19/23 11:09 AM   Result Value Ref Range    WBC 8.24 4.50 - 11.50 x10(3)/mcL    RBC 6.18 (H) 4.70 - 6.10 x10(6)/mcL    Hgb 19.1 (H) 14.0 - 18.0 g/dL    Hct 55.6 (H) 42.0 - 52.0 %    MCV 90.0 80.0 - 94.0 fL    MCH 30.9 27.0 - 31.0 pg    MCHC 34.4 33.0 - 36.0 g/dL    RDW 13.3 11.5 - 17.0 %    Platelet 301 130 - 400 x10(3)/mcL    MPV 8.8 7.4 - 10.4 fL    Neut % 58.7 %    Lymph % 27.4 %    Mono % 7.4 %    Eos % 5.5 %    Basophil % 0.8 %    Lymph # 2.26 0.6 - 4.6 x10(3)/mcL    Neut # 4.83 2.1 - 9.2 x10(3)/mcL    Mono # 0.61 0.1 - 1.3 x10(3)/mcL    Eos # 0.45 0 - 0.9 x10(3)/mcL    Baso # 0.07 <=0.2 x10(3)/mcL    IG# 0.02 0 - 0.04 x10(3)/mcL    IG% 0.2 %   Blood Smear Microscopic Exam    Collection Time: 07/19/23 11:09 AM   Result Value Ref Range    RBC Morph Normal Normal    Platelets Adequate Normal, Adequate            Imaging Results              CT Head Without Contrast (Final result)  Result time 07/19/23 11:54:57      Final result by Sabino Alcazar MD (07/19/23 11:54:57)                   Impression:      1. No acute intracranial abnormality identified  2. Scattered mucosal thickening at the paranasal sinuses with bony defects at the medial maxillary sinus walls bilaterally suggesting postsurgical changes and or dehiscence  3. Mild left mastoiditis  4. Mild generalized cerebral and cerebellar atrophy  5. Minimal intracranial atherosclerosis      Electronically signed  by: Sabino Alcazar  Date:    07/19/2023  Time:    11:54               Narrative:    EXAMINATION:  CT HEAD WITHOUT CONTRAST    CLINICAL HISTORY:  Headache, new or worsening (Age >= 50y);, .    TECHNIQUE:  PATIENT RADIATION DOSE: DLP(mGycm) 906    As per PQRS measures, all CT scans at this facility used dose modulation, iterative reconstruction, and/or weight based dose adjustment when appropriate to reduce radiation dose to as low as reasonably achievable.    COMPARISON:  MR brain 04/17/2023    FINDINGS:  Serial axial images were obtained of the head without the administration of IV contrast. Both brain and bone parenchymal windows were obtained.  Additional coronal and sagittal reconstructions were obtained.  Ventricles, cisterns, and sulci are mildly prominent size.  There is no evidence of intracranial hemorrhage, midline shift, mass effect, or abnormal extra-axial fluid collections.  Cerebellar tonsils extend caudally to the level of foramen magnum.  Minimal intracranial atherosclerosis is seen.  There is scattered mucosal thickening at the paranasal sinuses.  There are bony defects at the medial maxillary sinus walls bilaterally.  There is mild fluid density at the inferior left mastoid air cells.  External auditory canals are grossly patent.                                       Medications   lisinopriL tablet 20 mg (20 mg Oral Given 7/19/23 1205)     Medical Decision Making:   Initial Assessment:   As migraine headaches but this headache has been different has been rather constant for the last 4- 5 days and noticed his blood pressure is running very high   normocephalic atraumatic pleasant polite normal TMs nose and oropharynx no stiff neck no nuchal rigidity heart regular rate and rhythm lungs are fairly clear abdomen is benign with good bowel sounds extremities without clubbing cyanosis edema GCS 15 is no evidence focal global neurological deficit  Differential Diagnosis:   Cephalgia migraine headaches  hypertensive related headaches.  Anxiety.  Clinical Tests:   Lab Tests: Ordered and Reviewed       <> Summary of Lab: Lab work looks good today's BUN creatinine are still within the normal range.  Brain CT was unremarkable CBC shows an to be polycythemic.  Normally runs above 17.  But today he is at 19  Lab work otherwise unremarkable  ED Management:  Patient was given lisinopril which tolerated well blood pressure came down slightly 165/95 I do not want to bottom out this man's pressure I think if he goes home on daily lisinopril it will come down another 15 or 20 points the blood pressure they tell me normally runs at the clinic.  Like 145 to 150/95 is higher than was normally considered normal tension.  But apparently is not being treated  MDM  Problems addressed  Co-morbidities and/or factors adding to the complexity or risk for the patient:  Polypharmacy  Problems addressed:  Hypertension headache  Acute problem/illness or progression/exacerbation of chronic problem with potential threat to life/bodily dysfunction?:  Hypertensive crisis  Differential diagnoses/problems considered: see above     Amount and/or Complexity of Data Reviewed  Independent Historian: spouse  (see above for summary)  External Data Reviewed: notes from clinic visits and prescription medications  (see above for summary)  Risk and benefits of testing: discussed   Labs: Labs: ordered and reviewed  Radiology:Radiology: ordered and independent interpretation performed (see above or ED course)  ECG/medicine tests:Radiology: ordered and independent interpretation performed (see above or ED course)  none    Risk  Prescription drug management   Parenteral controlled substances   Diagnosis or treatment significantly impacted by social determinants of health: none   Shared decision making     Critical Care  none            ED Course as of 07/19/23 1333   Wed Jul 19, 2023   1254 Patient is feeling better blood pressure is 164/95 I talked to him  about lisinopril once a day I talked to him about getting out of this environment back in his own home environment I think his pressure continued to come down he said his headache is not bad right now.  Number put him on a 30 day course of lisinopril so he can [facility].  Recommend he avoid salt continue his other medications. [DM]      ED Course User Index  [DM] Sabino Hendricks MD                 Clinical Impression:   Final diagnoses:  [R03.0] Elevated blood pressure reading (Primary)  [I10] Hypertension, unspecified type  [G44.209] Tension-type headache, not intractable, unspecified chronicity pattern        ED Disposition Condition    Discharge Stable          ED Prescriptions       Medication Sig Dispense Start Date End Date Auth. Provider    lisinopriL (PRINIVIL,ZESTRIL) 20 MG tablet Take 1 tablet (20 mg total) by mouth once daily. 30 tablet 7/19/2023 8/18/2023 Sabino Hendricks MD          Follow-up Information       Follow up With Specialties Details Why Contact Info    Carito Golden MD Emergency Medicine In 1 week re evaluated before you run out of you medications you have 30 days [address] [facility] [address]  [phone]               Sabino Hendricks MD  07/19/23 1303       Sabino Hendricks MD  07/19/23 1334

## 2023-08-17 DIAGNOSIS — L40.50 PSORIATIC ARTHRITIS: Primary | ICD-10-CM

## 2023-09-07 DIAGNOSIS — Z87.891 PERSONAL HISTORY OF SMOKING: Primary | ICD-10-CM

## 2023-09-27 ENCOUNTER — HOSPITAL ENCOUNTER (OUTPATIENT)
Dept: RADIOLOGY | Facility: HOSPITAL | Age: 58
Discharge: HOME | End: 2023-09-27
Attending: FAMILY MEDICINE
Payer: OTHER GOVERNMENT

## 2023-09-27 DIAGNOSIS — Z87.891 PERSONAL HISTORY OF SMOKING: ICD-10-CM

## 2023-09-27 PROCEDURE — 71271 CT THORAX LUNG CANCER SCR C-: CPT | Mod: TC

## 2024-10-16 DIAGNOSIS — E27.8 OTHER SPECIFIED DISORDERS OF ADRENAL GLAND: Primary | ICD-10-CM

## 2024-10-25 ENCOUNTER — HOSPITAL ENCOUNTER (OUTPATIENT)
Dept: RADIOLOGY | Facility: HOSPITAL | Age: 59
Discharge: HOME | End: 2024-10-25
Attending: FAMILY MEDICINE
Payer: OTHER GOVERNMENT

## 2024-10-25 DIAGNOSIS — E27.8 OTHER SPECIFIED DISORDERS OF ADRENAL GLAND: ICD-10-CM

## 2024-10-25 PROCEDURE — A9552 F18 FDG: HCPCS | Performed by: FAMILY MEDICINE

## 2024-10-25 PROCEDURE — 78815 PET IMAGE W/CT SKULL-THIGH: CPT | Mod: TC

## 2024-10-25 RX ORDER — FLUDEOXYGLUCOSE F18 500 MCI/ML
10 INJECTION INTRAVENOUS
Status: COMPLETED | OUTPATIENT
Start: 2024-10-25 | End: 2024-10-25

## 2024-10-25 RX ADMIN — FLUDEOXYGLUCOSE F-18 10.1 MILLICURIE: 500 INJECTION INTRAVENOUS at 10:10

## 2025-01-31 ENCOUNTER — HOSPITAL ENCOUNTER (EMERGENCY)
Facility: HOSPITAL | Age: 60
Discharge: HOME | End: 2025-01-31
Attending: INTERNAL MEDICINE
Payer: OTHER GOVERNMENT

## 2025-01-31 VITALS
HEIGHT: 69 IN | SYSTOLIC BLOOD PRESSURE: 147 MMHG | RESPIRATION RATE: 20 BRPM | HEART RATE: 84 BPM | BODY MASS INDEX: 34.07 KG/M2 | DIASTOLIC BLOOD PRESSURE: 90 MMHG | WEIGHT: 230 LBS | TEMPERATURE: 96 F | OXYGEN SATURATION: 98 %

## 2025-01-31 DIAGNOSIS — E87.6 HYPOKALEMIA: Primary | ICD-10-CM

## 2025-01-31 LAB
ALBUMIN SERPL-MCNC: 3.9 G/DL (ref 3.5–5)
ALBUMIN/GLOB SERPL: 1.1 RATIO (ref 1.1–2)
ALP SERPL-CCNC: 105 UNIT/L (ref 40–150)
ALT SERPL-CCNC: 14 UNIT/L (ref 0–55)
ANION GAP SERPL CALC-SCNC: 12 MEQ/L
AST SERPL-CCNC: 26 UNIT/L (ref 5–34)
BACTERIA #/AREA URNS AUTO: NORMAL /HPF
BASOPHILS # BLD AUTO: 0.02 X10(3)/MCL
BASOPHILS NFR BLD AUTO: 0.1 %
BILIRUB SERPL-MCNC: 0.5 MG/DL
BILIRUB UR QL STRIP.AUTO: NEGATIVE
BUN SERPL-MCNC: 9 MG/DL (ref 8.4–25.7)
CALCIUM SERPL-MCNC: 9.2 MG/DL (ref 8.4–10.2)
CHLORIDE SERPL-SCNC: 92 MMOL/L (ref 98–107)
CLARITY UR: CLEAR
CO2 SERPL-SCNC: 36 MMOL/L (ref 22–29)
COLOR UR AUTO: YELLOW
CREAT SERPL-MCNC: 0.73 MG/DL (ref 0.72–1.25)
CREAT/UREA NIT SERPL: 12
EOSINOPHIL # BLD AUTO: 0.07 X10(3)/MCL (ref 0–0.9)
EOSINOPHIL NFR BLD AUTO: 0.5 %
ERYTHROCYTE [DISTWIDTH] IN BLOOD BY AUTOMATED COUNT: 13.9 % (ref 11.5–17)
GFR SERPLBLD CREATININE-BSD FMLA CKD-EPI: >60 ML/MIN/1.73/M2
GLOBULIN SER-MCNC: 3.5 GM/DL (ref 2.4–3.5)
GLUCOSE SERPL-MCNC: 100 MG/DL (ref 74–100)
GLUCOSE UR QL STRIP: ABNORMAL
HCT VFR BLD AUTO: 47.9 % (ref 42–52)
HGB BLD-MCNC: 16.8 G/DL (ref 14–18)
HGB UR QL STRIP: NEGATIVE
IMM GRANULOCYTES # BLD AUTO: 0.01 X10(3)/MCL (ref 0–0.04)
IMM GRANULOCYTES NFR BLD AUTO: 0.1 %
KETONES UR QL STRIP: NEGATIVE
LEUKOCYTE ESTERASE UR QL STRIP: NEGATIVE
LYMPHOCYTES # BLD AUTO: 1.95 X10(3)/MCL (ref 0.6–4.6)
LYMPHOCYTES NFR BLD AUTO: 14.4 %
MAGNESIUM SERPL-MCNC: 1.7 MG/DL (ref 1.6–2.6)
MCH RBC QN AUTO: 32.2 PG (ref 27–31)
MCHC RBC AUTO-ENTMCNC: 35.1 G/DL (ref 33–36)
MCV RBC AUTO: 91.9 FL (ref 80–94)
MONOCYTES # BLD AUTO: 0.99 X10(3)/MCL (ref 0.1–1.3)
MONOCYTES NFR BLD AUTO: 7.3 %
NEUTROPHILS # BLD AUTO: 10.51 X10(3)/MCL (ref 2.1–9.2)
NEUTROPHILS NFR BLD AUTO: 77.6 %
NITRITE UR QL STRIP: NEGATIVE
PH UR STRIP: 7 [PH]
PLATELET # BLD AUTO: 238 X10(3)/MCL (ref 130–400)
PMV BLD AUTO: 8.7 FL (ref 7.4–10.4)
POTASSIUM SERPL-SCNC: 3.1 MMOL/L (ref 3.5–5.1)
PROT SERPL-MCNC: 7.4 GM/DL (ref 6.4–8.3)
PROT UR QL STRIP: ABNORMAL
RBC # BLD AUTO: 5.21 X10(6)/MCL (ref 4.7–6.1)
RBC #/AREA URNS AUTO: NORMAL /HPF
SODIUM SERPL-SCNC: 140 MMOL/L (ref 136–145)
SP GR UR STRIP.AUTO: 1.02 (ref 1–1.03)
SQUAMOUS #/AREA URNS AUTO: NORMAL /HPF
TROPONIN I SERPL-MCNC: <0.01 NG/ML (ref 0–0.04)
TSH SERPL-ACNC: 1.32 UIU/ML (ref 0.35–4.94)
UROBILINOGEN UR STRIP-ACNC: 4
WBC # BLD AUTO: 13.55 X10(3)/MCL (ref 4.5–11.5)
WBC #/AREA URNS AUTO: NORMAL /HPF

## 2025-01-31 PROCEDURE — 93005 ELECTROCARDIOGRAM TRACING: CPT

## 2025-01-31 PROCEDURE — 36415 COLL VENOUS BLD VENIPUNCTURE: CPT | Performed by: INTERNAL MEDICINE

## 2025-01-31 PROCEDURE — 25000003 PHARM REV CODE 250: Performed by: INTERNAL MEDICINE

## 2025-01-31 PROCEDURE — 84443 ASSAY THYROID STIM HORMONE: CPT | Performed by: INTERNAL MEDICINE

## 2025-01-31 PROCEDURE — 63600175 PHARM REV CODE 636 W HCPCS: Performed by: INTERNAL MEDICINE

## 2025-01-31 PROCEDURE — 96374 THER/PROPH/DIAG INJ IV PUSH: CPT

## 2025-01-31 PROCEDURE — 84484 ASSAY OF TROPONIN QUANT: CPT | Performed by: INTERNAL MEDICINE

## 2025-01-31 PROCEDURE — 99284 EMERGENCY DEPT VISIT MOD MDM: CPT | Mod: 25

## 2025-01-31 PROCEDURE — 80053 COMPREHEN METABOLIC PANEL: CPT | Performed by: INTERNAL MEDICINE

## 2025-01-31 PROCEDURE — 85025 COMPLETE CBC W/AUTO DIFF WBC: CPT | Performed by: INTERNAL MEDICINE

## 2025-01-31 PROCEDURE — 93010 ELECTROCARDIOGRAM REPORT: CPT | Mod: ,,, | Performed by: INTERNAL MEDICINE

## 2025-01-31 PROCEDURE — 83735 ASSAY OF MAGNESIUM: CPT | Performed by: INTERNAL MEDICINE

## 2025-01-31 PROCEDURE — 81001 URINALYSIS AUTO W/SCOPE: CPT | Performed by: INTERNAL MEDICINE

## 2025-01-31 RX ORDER — ONDANSETRON HYDROCHLORIDE 2 MG/ML
4 INJECTION, SOLUTION INTRAVENOUS
Status: COMPLETED | OUTPATIENT
Start: 2025-01-31 | End: 2025-01-31

## 2025-01-31 RX ORDER — POTASSIUM CHLORIDE 20 MEQ/1
40 TABLET, EXTENDED RELEASE ORAL
Status: COMPLETED | OUTPATIENT
Start: 2025-01-31 | End: 2025-01-31

## 2025-01-31 RX ADMIN — POTASSIUM CHLORIDE 40 MEQ: 1500 TABLET, EXTENDED RELEASE ORAL at 08:01

## 2025-01-31 RX ADMIN — ONDANSETRON 4 MG: 2 INJECTION INTRAMUSCULAR; INTRAVENOUS at 06:01

## 2025-02-01 LAB
OHS QRS DURATION: 74 MS
OHS QTC CALCULATION: 525 MS

## 2025-02-01 NOTE — ED PROVIDER NOTES
Encounter Date: 1/31/2025       History     Chief Complaint   Patient presents with    Abnormal Labs     [facility] and was told his k is  1.6, pt ha and weakness.      60-year-old male with a past medical history of CHF, hyperlipidemia, TBI and migraines who presents with abnormal labs.  Patient reports he had a primary care visit on the 6th of this month and this morning he had his routine outpatient labs.  He was called today and told to come to the emergency department as his potassium is low.  He does admit to diarrhea that is chronic from his medications and unchanged.  He does admit to some feeling uneasy which he describes his lightheadedness as well as nausea.  His nausea is currently minimal.  He denies any changes in medication.  He does take potassium supplements daily, which he has not missed any dosages.  He denies fevers, chills, vomiting, chest pain, shortness of breath, abdominal pain, constipation, dysuria, hematuria, syncope, headache, visual changes, numbness/tingling, focal deficits, extremity swelling, extremity weakness    The history is provided by the patient.     Review of patient's allergies indicates:   Allergen Reactions    Morphine     Sulfa (sulfonamide antibiotics)      Past Medical History:   Diagnosis Date    CHF (congestive heart failure)     Depression     DJD (degenerative joint disease)     Hyperlipidemia     Hypogonadism in male     Migraines     PTSD (post-traumatic stress disorder)     TBI (traumatic brain injury)      Past Surgical History:   Procedure Laterality Date    APPENDECTOMY      KNEE SURGERY      PENILE PROSTHESIS IMPLANT      SINUS SURGERY       No family history on file.  Social History     Tobacco Use    Smoking status: Every Day     Current packs/day: 0.25     Types: Cigarettes    Smokeless tobacco: Never   Substance Use Topics    Alcohol use: Not Currently    Drug use: Never     Review of Systems   All other systems reviewed and  are negative.      Physical Exam     Initial Vitals [01/31/25 1757]   BP Pulse Resp Temp SpO2   (!) 164/96 90 18 96.1 °F (35.6 °C) 98 %      MAP       --         Physical Exam    Constitutional: He appears well-developed and well-nourished. He is not diaphoretic. He is cooperative.  Non-toxic appearance. He appears ill (Chronically). No distress.   HENT:   Head: Normocephalic and atraumatic. Mouth/Throat: Uvula is midline, oropharynx is clear and moist and mucous membranes are normal.   Eyes: Conjunctivae, EOM and lids are normal. Pupils are equal, round, and reactive to light.   Neck: Trachea normal and phonation normal. Neck supple. Carotid bruit is not present. No JVD present.    Full passive range of motion without pain.     Cardiovascular:  Normal rate, regular rhythm, normal heart sounds and normal pulses.           No murmur heard.  Pulmonary/Chest: No accessory muscle usage. No tachypnea. No respiratory distress. He has no decreased breath sounds. He has no wheezes. He has no rhonchi. He has no rales.   Abdominal: Abdomen is soft. Bowel sounds are normal. He exhibits no distension. There is no abdominal tenderness. No hernia. There is no rebound and no guarding.   Musculoskeletal:      Cervical back: Full passive range of motion without pain and neck supple.     Neurological: He is alert and oriented to person, place, and time. He has normal strength and normal reflexes. No cranial nerve deficit or sensory deficit. He displays a negative Romberg sign. GCS eye subscore is 4. GCS verbal subscore is 5. GCS motor subscore is 6.   Skin: Skin is warm, dry and intact. Capillary refill takes less than 2 seconds. No rash noted.   Psychiatric: He has a normal mood and affect. His speech is normal and behavior is normal. Judgment and thought content normal. Cognition and memory are normal.         ED Course   Procedures  Labs Reviewed   COMPREHENSIVE METABOLIC PANEL - Abnormal       Result Value    Sodium 140       Potassium 3.1 (*)     Chloride 92 (*)     CO2 36 (*)     Glucose 100      Blood Urea Nitrogen 9.0      Creatinine 0.73      Calcium 9.2      Protein Total 7.4      Albumin 3.9      Globulin 3.5      Albumin/Globulin Ratio 1.1      Bilirubin Total 0.5            ALT 14      AST 26      eGFR >60      Anion Gap 12.0      BUN/Creatinine Ratio 12     URINALYSIS, REFLEX TO URINE CULTURE - Abnormal    Color, UA Yellow      Appearance, UA Clear      Specific Gravity, UA 1.020      pH, UA 7.0      Protein, UA 2+ (*)     Glucose, UA Trace (*)     Ketones, UA Negative      Blood, UA Negative      Bilirubin, UA Negative      Urobilinogen, UA 4.0 (*)     Nitrites, UA Negative      Leukocyte Esterase, UA Negative     CBC WITH DIFFERENTIAL - Abnormal    WBC 13.55 (*)     RBC 5.21      Hgb 16.8      Hct 47.9      MCV 91.9      MCH 32.2 (*)     MCHC 35.1      RDW 13.9      Platelet 238      MPV 8.7      Neut % 77.6      Lymph % 14.4      Mono % 7.3      Eos % 0.5      Basophil % 0.1      Imm Grans % 0.1      Neut # 10.51 (*)     Lymph # 1.95      Mono # 0.99      Eos # 0.07      Baso # 0.02      Imm Gran # 0.01     MAGNESIUM - Normal    Magnesium Level 1.70     TROPONIN I - Normal    Troponin-I <0.010     URINALYSIS, MICROSCOPIC - Normal    Bacteria, UA None Seen      RBC, UA 0-2      WBC, UA 0-2      Squamous Epithelial Cells, UA Rare     CBC W/ AUTO DIFFERENTIAL    Narrative:     The following orders were created for panel order CBC auto differential.  Procedure                               Abnormality         Status                     ---------                               -----------         ------                     CBC with Differential[[id number]]        Abnormal            Final result                 Please view results for these tests on the individual orders.   TSH     EKG Readings: (Independently Interpreted)   Normal sinus rhythm with a rate of 88 beats per minute.  No obvious ischemic changes or arrhythmia.   QTC is 525     ECG Results              EKG 12-lead (In process)        Collection Time Result Time QRS Duration OHS QTC Calculation    01/31/25 18:18:46 01/31/25 18:37:00 74 525                     In process by Interface, Lab In [facility] (01/31/25 18:37:05)                   Narrative:    Test Reason : R42,    Vent. Rate :  88 BPM     Atrial Rate :  88 BPM     P-R Int : 150 ms          QRS Dur :  74 ms      QT Int : 434 ms       P-R-T Axes :  54  18  12 degrees    QTcB Int : 525 ms    Normal sinus rhythm  Septal infarct ,age undetermined  Abnormal ECG  No previous ECGs available    Referred By: AAAREFERRAL SELF           Confirmed By:                                   Imaging Results    None          Medications   ondansetron injection 4 mg (4 mg Intravenous Given 1/31/25 1837)   potassium chloride SA CR tablet 40 mEq (40 mEq Oral Given 1/31/25 2007)     Medical Decision Making  60-year-old male presenting with outpatient low potassium.  Vital signs stable, afebrile.    Initial Assessment:     Hypokalemia    Differential Diagnosis:   Judging by the patient's chief complaint and pertinent history, the patient has the following possible differential diagnoses, including but not limited to the following.  Some of these are deemed to be lower likelihood and some more likely based on my physical exam and history combined with possible lab work and/or imaging studies.   Please see the pertinent studies, and refer to the HPI.  Some of these diagnoses will take further evaluation to fully rule out, perhaps as an outpatient and the patient was encouraged to follow up when discharged for more comprehensive evaluation.      Hypokalemia from dehydration, laboratory error, medication effect, multiple other etiologies    I will obtain CBC, CMP, magnesium, urine, TSH and trope.  We will obtain an EKG.  Disposition determined based on workup    Problems Addressed:  Hypokalemia: self-limited or minor problem    Amount and/or  Complexity of Data Reviewed  External Data Reviewed: labs, ECG and notes.  Labs: ordered. Decision-making details documented in ED Course.  ECG/medicine tests: ordered and independent interpretation performed.     Details: Normal sinus rhythm with a rate of 88 beats per minute.  No obvious ischemic changes or arrhythmia.  QTC is 525      Risk  OTC drugs.  Prescription drug management.               ED Course as of 01/31/25 2007 Fri Jan 31, 2025 2003 Potassium(!): 3.1 [MM]   2003 Chloride(!): 92 [MM]   2003 CO2(!): 36 [MM]   2003 WBC(!): 13.55 [MM]   2003 Potassium is 3.1, labs otherwise without significant abnormalities.  UA is without UTI or hematuria.  Patient was given Zofran with improvement of nausea.  On re-evaluation he is resting comfortably, ambulatory without ataxia, dizziness or lightheadedness currently.  Results discussed with the patient, his potassium was replaced p.o..  He does report he took a bunch of and bananas prior to arrival and does take potassium supplements at home.  After shared decision-making, I believe patient is stable for outpatient follow-up.  Stressed the importance of contacting his PCP for laboratory rechecked. Patient is stable for discharge home with outpatient follow-up.  Stressed the importance of close follow-up with PCP.  I answered all questions to the best of my ability.  Patient/family verbalized an understanding of the plan and agreed.   [MM]      ED Course User Index  [MM] Jeffrey Swann DO                           Clinical Impression:  Final diagnoses:  [E87.6] Hypokalemia (Primary)          ED Disposition Condition    Discharge Stable          ED Prescriptions    None       Follow-up Information       Follow up With Specialties Details Why Contact Info    [facility] Emergency Medicine Go to  If symptoms worsen [address] [facility] [address]  [phone]             Jeffrey Swann DO  01/31/25 2008